# Patient Record
Sex: FEMALE | Race: WHITE | NOT HISPANIC OR LATINO | Employment: FULL TIME | URBAN - NONMETROPOLITAN AREA
[De-identification: names, ages, dates, MRNs, and addresses within clinical notes are randomized per-mention and may not be internally consistent; named-entity substitution may affect disease eponyms.]

---

## 2017-09-05 RX ORDER — DROSPIRENONE/ETHINYL ESTRADIOL/LEVOMEFOLATE CALCIUM AND LEVOMEFOLATE CALCIUM 3-0.02(24)
KIT ORAL
Qty: 28 TABLET | Refills: 12 | OUTPATIENT
Start: 2017-09-05

## 2017-09-11 RX ORDER — DROSPIRENONE, ETHINYL ESTRADIOL AND LEVOMEFOLATE CALCIUM AND LEVOMEFOLATE CALCIUM 3-0.02(24)
1 KIT ORAL DAILY
Qty: 28 TABLET | Refills: 0 | Status: SHIPPED | OUTPATIENT
Start: 2017-09-11 | End: 2017-10-06 | Stop reason: SDUPTHER

## 2017-09-11 NOTE — PROGRESS NOTES
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 She will be out of OCP by then.

## 2017-10-06 ENCOUNTER — OFFICE VISIT (OUTPATIENT)
Dept: OBSTETRICS AND GYNECOLOGY | Facility: CLINIC | Age: 37
End: 2017-10-06

## 2017-10-06 VITALS
WEIGHT: 239 LBS | SYSTOLIC BLOOD PRESSURE: 110 MMHG | HEART RATE: 92 BPM | HEIGHT: 66 IN | BODY MASS INDEX: 38.41 KG/M2 | DIASTOLIC BLOOD PRESSURE: 76 MMHG | OXYGEN SATURATION: 96 %

## 2017-10-06 DIAGNOSIS — Z01.419 ENCOUNTER FOR GYNECOLOGICAL EXAMINATION WITH PAPANICOLAOU SMEAR OF CERVIX: Primary | ICD-10-CM

## 2017-10-06 DIAGNOSIS — N91.2 AMENORRHEA DUE TO ORAL CONTRACEPTIVE: ICD-10-CM

## 2017-10-06 DIAGNOSIS — Z79.3 AMENORRHEA DUE TO ORAL CONTRACEPTIVE: ICD-10-CM

## 2017-10-06 PROCEDURE — 88142 CYTOPATH C/V THIN LAYER: CPT | Performed by: PATHOLOGY

## 2017-10-06 PROCEDURE — 99395 PREV VISIT EST AGE 18-39: CPT | Performed by: NURSE PRACTITIONER

## 2017-10-06 RX ORDER — DROSPIRENONE, ETHINYL ESTRADIOL AND LEVOMEFOLATE CALCIUM AND LEVOMEFOLATE CALCIUM 3-0.02(24)
1 KIT ORAL DAILY
Qty: 28 TABLET | Refills: 11 | Status: SHIPPED | OUTPATIENT
Start: 2017-10-06 | End: 2018-08-06 | Stop reason: SDUPTHER

## 2017-10-07 NOTE — PROGRESS NOTES
Subjective   Chief Complaint   Patient presents with   • Gynecologic Exam     pap/ well woman exam     Liyah Musa is a 37 y.o. year old  presenting to be seen for her annual exam.  Today she has no complaints.    No LMP recorded. Takes OCP continuously.     OB History      Para Term  AB Living    1 1        SAB TAB Ectopic Multiple Live Births                  Current birth control method: OCP (estrogen/progesterone) and tubal ligation.    She is sexually active.  In the past 12 months there has not been new sexual partners.  Condoms are not typically used.  She would not like to be screened for STD's at today's exam.     Past 6 month menstrual history:    Cycle Frequency: absent   Menstrual cycle character: flow has been absent   Cycle Duration: 0 - 1   Number of heavy days of flows: 0   Dysmenorrhea: none and is not affecting her activities of daily living   PMS: none and is not affecting her activities of daily living   Intermenstrual bleeding present: no   Post-coital bleeding present: no     She exercises regularly: yes.  She wears her seat belt:yes.  She has concerns about domestic violence: no.  Last colonoscopy: Never  Last DEXA: Never  Last MMG: Never  Last pap: 16  History of abnormal PAP: Yes    The following portions of the patient's history were reviewed and updated as appropriate:problem list, current medications, allergies, past family history, past medical history, past social history and past surgical history.    Smoking status: Current Every Day Smoker                                                   Packs/day: 0.25      Years: 0.00         Types: Cigarettes  Smokeless status: Never Used                      Ready to quit: No  Counseling given: Yes  She states she knows she can quit. She is only smoking 3-4 cigs/day    History   Alcohol Use No      Review of Systems   Constitutional: Negative.    Respiratory: Negative.    Cardiovascular: Negative.    Gastrointestinal:  "Negative.    Endocrine: Negative.    Genitourinary: Negative.  Negative for menstrual problem.   Skin: Negative.    Neurological: Negative for dizziness, syncope, light-headedness and headaches.   Psychiatric/Behavioral: Negative for suicidal ideas. The patient is not nervous/anxious.         Stressed with nursing school         Objective   /76  Pulse 92  Ht 66\" (167.6 cm)  Wt 239 lb (108 kg)  LMP Comment: OCP Continuous  SpO2 96%  BMI 38.58 kg/m2    General:  well developed; well nourished  no acute distress   Skin:  No suspicious lesions seen   Thyroid: normal to inspection and palpation   Breasts:  Examined in supine position  Symmetric without masses or skin dimpling  Nipples normal without inversion, lesions or discharge  There are no palpable axillary nodes  Fibrocystic changes are present both breasts without a discrete mass   Abdomen: soft, non-tender; no masses  no umbilical or inginual hernias are present  no hepato-splenomegaly  Normal findings: bowel sounds normal   Cardiac: Heart sounds are normal.  Regular rate and rhythm without murmur, gallop or rub.   Resp: Normal expansion.  Clear to auscultation.  No rales, rhonchi, or wheezing.   Psych: alert,oriented, in NAD with a full range of affect, normal behavior and no psychotic features   Pelvis: Uterus:  Clinical staff was present for exam  External genitalia:  normal appearance of the external genitalia including Bartholin's and [place]'s glands.  :  urethral meatus normal;  Vaginal:  normal pink mucosa without prolapse or lesions  Cervix:  normal appearance.  Uterus:  normal size, shape and consistency  Adnexa:  non palpable bilaterally.     Lab Review   No data reviewed    Imaging  No data reviewed       Assessment   1. Encounter for GYN exam with pap smear of cervix  2. Amenorrhea due to oral contraceptive     Plan   1. Pap results: I will send card in mail or call if abnormal. RTC annually for well woman exams  2. Discussed risks of OCP " due to age and smoking status. Pt verbalizes understanding of these risks and agrees to consider progesterone only methods at next visit in May when school is out.      New Medications Ordered This Visit   Medications   • Drospiren-Eth Estrad-Levomefol (BEYAZ) 3-0.02-0.451 MG tablet     Sig: Take 1 tablet by mouth Daily.     Dispense:  28 tablet     Refill:  11          This note was electronically signed.    Zoe Joy, APRN  October 7, 2017

## 2017-10-10 LAB
LAB AP CASE REPORT: NORMAL
LAB AP GYN ADDITIONAL INFORMATION: NORMAL
Lab: NORMAL
PATH INTERP SPEC-IMP: NORMAL
STAT OF ADQ CVX/VAG CYTO-IMP: NORMAL

## 2018-08-06 DIAGNOSIS — N91.2 AMENORRHEA DUE TO ORAL CONTRACEPTIVE: ICD-10-CM

## 2018-08-06 DIAGNOSIS — Z79.3 AMENORRHEA DUE TO ORAL CONTRACEPTIVE: ICD-10-CM

## 2018-08-06 RX ORDER — DROSPIRENONE, ETHINYL ESTRADIOL AND LEVOMEFOLATE CALCIUM AND LEVOMEFOLATE CALCIUM 3-0.02(24)
KIT ORAL
Qty: 28 TABLET | Refills: 3 | Status: SHIPPED | OUTPATIENT
Start: 2018-08-06 | End: 2018-12-11 | Stop reason: SDUPTHER

## 2018-11-30 DIAGNOSIS — N91.2 AMENORRHEA DUE TO ORAL CONTRACEPTIVE: ICD-10-CM

## 2018-11-30 DIAGNOSIS — Z79.3 AMENORRHEA DUE TO ORAL CONTRACEPTIVE: ICD-10-CM

## 2018-11-30 RX ORDER — DROSPIRENONE, ETHINYL ESTRADIOL AND LEVOMEFOLATE CALCIUM AND LEVOMEFOLATE CALCIUM 3-0.02(24)
KIT ORAL
Qty: 28 TABLET | Refills: 3 | OUTPATIENT
Start: 2018-11-30

## 2018-12-11 ENCOUNTER — OFFICE VISIT (OUTPATIENT)
Dept: OBSTETRICS AND GYNECOLOGY | Facility: CLINIC | Age: 38
End: 2018-12-11

## 2018-12-11 VITALS
DIASTOLIC BLOOD PRESSURE: 78 MMHG | HEART RATE: 97 BPM | SYSTOLIC BLOOD PRESSURE: 120 MMHG | BODY MASS INDEX: 40.37 KG/M2 | WEIGHT: 251.2 LBS | RESPIRATION RATE: 16 BRPM | HEIGHT: 66 IN

## 2018-12-11 DIAGNOSIS — Z79.3 AMENORRHEA DUE TO ORAL CONTRACEPTIVE: ICD-10-CM

## 2018-12-11 DIAGNOSIS — Z01.419 ENCOUNTER FOR GYNECOLOGICAL EXAMINATION WITH PAPANICOLAOU SMEAR OF CERVIX: Primary | ICD-10-CM

## 2018-12-11 DIAGNOSIS — N91.2 AMENORRHEA DUE TO ORAL CONTRACEPTIVE: ICD-10-CM

## 2018-12-11 PROBLEM — E66.01 MORBID OBESITY: Status: ACTIVE | Noted: 2018-01-26

## 2018-12-11 PROCEDURE — 99395 PREV VISIT EST AGE 18-39: CPT | Performed by: NURSE PRACTITIONER

## 2018-12-11 PROCEDURE — G0123 SCREEN CERV/VAG THIN LAYER: HCPCS | Performed by: NURSE PRACTITIONER

## 2018-12-11 RX ORDER — PROPRANOLOL HYDROCHLORIDE 20 MG/1
TABLET ORAL
Refills: 0 | COMMUNITY
Start: 2018-09-05 | End: 2020-02-03

## 2018-12-11 RX ORDER — DROSPIRENONE, ETHINYL ESTRADIOL AND LEVOMEFOLATE CALCIUM AND LEVOMEFOLATE CALCIUM 3-0.02(24)
1 KIT ORAL DAILY
Qty: 28 TABLET | Refills: 12 | Status: SHIPPED | OUTPATIENT
Start: 2018-12-11 | End: 2019-06-18

## 2018-12-11 RX ORDER — FENOFIBRATE 145 MG/1
145 TABLET, COATED ORAL DAILY
Refills: 2 | COMMUNITY
Start: 2018-12-05 | End: 2022-07-19

## 2018-12-11 NOTE — PROGRESS NOTES
Subjective   Chief Complaint   Patient presents with   • Gynecologic Exam     well woman annual visit     Liyah Musa is a 38 y.o. year old  presenting to be seen for her annual exam.  Today she has no complaints.    No LMP recorded (lmp unknown). Patient is not currently having periods (Reason: Oral contraceptives). Takes OCP continuously.     OB History      Para Term  AB Living    1 1            SAB TAB Ectopic Molar Multiple Live Births                         Current birth control method: OCP (estrogen/progesterone) and tubal ligation.    She is not currently sexually active. Going through a divorce. In the past 12 months there has not been new sexual partners.  Condoms are not typically used.  She would not like to be screened for STD's at today's exam.     Past 6 month menstrual history:    Cycle Frequency: absent   Menstrual cycle character: flow has been absent   Cycle Duration: 0 - 1   Number of heavy days of flows: 0   Dysmenorrhea: none and is not affecting her activities of daily living   PMS: none and is not affecting her activities of daily living   Intermenstrual bleeding present: no   Post-coital bleeding present: no     She exercises regularly: yes.  She wears her seat belt:yes.  She has concerns about domestic violence: no.  Last colonoscopy: Never  Last DEXA: Never  Last MMG: Never  Last pap: 10/6/17  History of abnormal PAP: Yes    The following portions of the patient's history were reviewed and updated as appropriate:problem list, current medications, allergies, past family history, past medical history, past social history and past surgical history.    Social History    Tobacco Use      Smoking status: Current Every Day Smoker        Packs/day: 0.25        Types: Cigarettes      Smokeless tobacco: Never Used  Ready to quit: No  Counseling given: Yes  She states she knows she can quit. She is only smoking 3-4 cigs/day    Social History     Substance and Sexual Activity  "  Alcohol Use No      Review of Systems   Constitutional: Negative.    Respiratory: Negative.    Cardiovascular: Negative.    Gastrointestinal: Negative.    Endocrine: Negative.    Genitourinary: Negative.  Negative for menstrual problem, pelvic pain and vaginal discharge.   Skin: Negative.    Neurological: Negative for dizziness, syncope, light-headedness and headaches.   Psychiatric/Behavioral: Negative for suicidal ideas. The patient is not nervous/anxious.         Stressed with nursing school         Objective   /78 (BP Location: Right arm, Patient Position: Sitting, Cuff Size: Adult)   Pulse 97   Resp 16   Ht 167.6 cm (66\")   Wt 114 kg (251 lb 3.2 oz)   LMP  (LMP Unknown) Comment: takes OCP continuously  Breastfeeding? No   BMI 40.54 kg/m²     General:  well developed; well nourished  no acute distress   Skin:  No suspicious lesions seen   Thyroid: normal to inspection and palpation   Breasts:  Examined in supine position  Symmetric without masses or skin dimpling  Nipples normal without inversion, lesions or discharge  There are no palpable axillary nodes  Fibrocystic changes are present both breasts without a discrete mass   Abdomen: soft, non-tender; no masses  no umbilical or inginual hernias are present  no hepato-splenomegaly  Normal findings: bowel sounds normal   Cardiac: Heart sounds are normal.  Regular rate and rhythm without murmur, gallop or rub.   Resp: Normal expansion.  Clear to auscultation.  No rales, rhonchi, or wheezing.   Psych: alert,oriented, in NAD with a full range of affect, normal behavior and no psychotic features   Pelvis: Uterus:  Clinical staff was present for exam  External genitalia:  normal appearance of the external genitalia including Bartholin's and [place]'s glands.  :  urethral meatus normal;  Vaginal:  normal pink mucosa without prolapse or lesions  Cervix:  normal appearance.  Uterus:  normal size, shape and consistency  Adnexa:  non palpable bilaterally. "     Lab Review   No data reviewed    Imaging  No data reviewed       Assessment   1. Encounter for GYN exam with pap smear of cervix  2. Amenorrhea due to oral contraceptive     Plan   1. Pap results: I will send card in mail or call if abnormal. RTC annually for well woman exams  2. Discussed risks of OCP due to age and smoking status. Pt verbalizes understanding of these risks fully. She does well and does not desire to change at this time.       New Medications Ordered This Visit   Medications   • Drospiren-Eth Estrad-Levomefol 3-0.02-0.451 MG tablet     Sig: Take 1 tablet by mouth Daily.     Dispense:  28 tablet     Refill:  12          This note was electronically signed.    Zoe Joy, APRN  December 11, 2018

## 2018-12-14 LAB
GEN CATEG CVX/VAG CYTO-IMP: NORMAL
LAB AP CASE REPORT: NORMAL
LAB AP GYN ADDITIONAL INFORMATION: NORMAL
LAB AP GYN OTHER FINDINGS: NORMAL
PATH INTERP SPEC-IMP: NORMAL
STAT OF ADQ CVX/VAG CYTO-IMP: NORMAL

## 2019-06-18 RX ORDER — DROSPIRENONE AND ETHINYL ESTRADIOL 0.02-3(28)
1 KIT ORAL DAILY
Qty: 21 TABLET | Refills: 17 | Status: SHIPPED | OUTPATIENT
Start: 2019-06-18 | End: 2019-12-30 | Stop reason: SDUPTHER

## 2019-12-30 RX ORDER — DROSPIRENONE AND ETHINYL ESTRADIOL 0.02-3(28)
1 KIT ORAL DAILY
Qty: 21 TABLET | Refills: 0 | Status: SHIPPED | OUTPATIENT
Start: 2019-12-30 | End: 2019-12-31 | Stop reason: SDUPTHER

## 2019-12-31 RX ORDER — DROSPIRENONE, ETHINYL ESTRADIOL AND LEVOMEFOLATE CALCIUM AND LEVOMEFOLATE CALCIUM 3-0.02(24)
1 KIT ORAL DAILY
Qty: 28 TABLET | Refills: 0 | Status: SHIPPED | OUTPATIENT
Start: 2019-12-31 | End: 2020-02-03 | Stop reason: SDUPTHER

## 2020-02-03 ENCOUNTER — OFFICE VISIT (OUTPATIENT)
Dept: OBSTETRICS AND GYNECOLOGY | Facility: CLINIC | Age: 40
End: 2020-02-03

## 2020-02-03 VITALS
WEIGHT: 240.4 LBS | DIASTOLIC BLOOD PRESSURE: 70 MMHG | BODY MASS INDEX: 38.63 KG/M2 | HEART RATE: 96 BPM | HEIGHT: 66 IN | SYSTOLIC BLOOD PRESSURE: 118 MMHG

## 2020-02-03 DIAGNOSIS — Z79.3 AMENORRHEA DUE TO ORAL CONTRACEPTIVE: ICD-10-CM

## 2020-02-03 DIAGNOSIS — Z01.419 ENCOUNTER FOR GYNECOLOGICAL EXAMINATION WITH PAPANICOLAOU SMEAR OF CERVIX: Primary | ICD-10-CM

## 2020-02-03 DIAGNOSIS — N91.2 AMENORRHEA DUE TO ORAL CONTRACEPTIVE: ICD-10-CM

## 2020-02-03 PROCEDURE — 87624 HPV HI-RISK TYP POOLED RSLT: CPT | Performed by: NURSE PRACTITIONER

## 2020-02-03 PROCEDURE — 99395 PREV VISIT EST AGE 18-39: CPT | Performed by: NURSE PRACTITIONER

## 2020-02-03 PROCEDURE — G0123 SCREEN CERV/VAG THIN LAYER: HCPCS | Performed by: NURSE PRACTITIONER

## 2020-02-03 RX ORDER — VENLAFAXINE HYDROCHLORIDE 75 MG/1
CAPSULE, EXTENDED RELEASE ORAL
COMMUNITY
Start: 2020-01-06 | End: 2020-11-13

## 2020-02-03 RX ORDER — DROSPIRENONE, ETHINYL ESTRADIOL AND LEVOMEFOLATE CALCIUM AND LEVOMEFOLATE CALCIUM 3-0.02(24)
1 KIT ORAL DAILY
Qty: 28 TABLET | Refills: 17 | Status: SHIPPED | OUTPATIENT
Start: 2020-02-03 | End: 2021-02-22

## 2020-02-03 NOTE — PROGRESS NOTES
Subjective   Chief Complaint   Patient presents with   • Gynecologic Exam     well woman annual    • Contraception     refill on OCP     Liyah Addison is a 39 y.o. year old  presenting to be seen for her annual exam.  Today she has no complaints.    No LMP recorded (lmp unknown). (Menstrual status: Oral contraceptives). Takes OCP continuously.     OB History        1    Para   1    Term                AB        Living           SAB        TAB        Ectopic        Molar        Multiple        Live Births                    Current birth control method: OCP (estrogen/progesterone) and tubal ligation.    She is not currently sexually active.  In the past 12 months there has not been new sexual partners.  Condoms are not typically used.  She would not like to be screened for STD's at today's exam.     Past 6 month menstrual history:    Cycle Frequency: absent   Menstrual cycle character: flow has been absent   Cycle Duration: 0 - 1   Number of heavy days of flows: 0   Dysmenorrhea: none and is not affecting her activities of daily living   PMS: none and is not affecting her activities of daily living   Intermenstrual bleeding present: no   Post-coital bleeding present: no     She exercises regularly: yes.  She wears her seat belt:yes.  She has concerns about domestic violence: no.  Last colonoscopy: Never  Last DEXA: Never  Last MMG: Never  Last pap: 18  History of abnormal PAP: Yes    The following portions of the patient's history were reviewed and updated as appropriate:problem list, current medications, allergies, past family history, past medical history, past social history and past surgical history.    Social History    Tobacco Use      Smoking status: Current Every Day Smoker        Packs/day: 0.25        Years: 19.00        Pack years: 4.75        Types: Cigarettes      Smokeless tobacco: Never Used  Ready to quit: No  Counseling given: Yes  She states she knows she can quit. She is only  "smoking 3-4 cigs/day    Social History     Substance and Sexual Activity   Alcohol Use No      Review of Systems   Constitutional: Negative.    Respiratory: Negative.    Cardiovascular: Negative.    Gastrointestinal: Negative.    Endocrine: Negative.    Genitourinary: Negative.  Negative for menstrual problem, pelvic pain and vaginal discharge.   Skin: Negative.    Neurological: Negative for dizziness, syncope, light-headedness and headaches.   Psychiatric/Behavioral: Negative for suicidal ideas. The patient is not nervous/anxious.          Objective   /70   Pulse 96   Ht 167.6 cm (66\")   Wt 109 kg (240 lb 6.4 oz)   LMP  (LMP Unknown) Comment: no period on OCP  Breastfeeding No   BMI 38.80 kg/m²     General:  well developed; well nourished  no acute distress   Skin:  No suspicious lesions seen   Thyroid: normal to inspection and palpation   Breasts:  Examined in supine position  Symmetric without masses or skin dimpling  Nipples normal without inversion, lesions or discharge  There are no palpable axillary nodes  Fibrocystic changes are present both breasts without a discrete mass   Abdomen: soft, non-tender; no masses  no umbilical or inginual hernias are present  no hepato-splenomegaly  Normal findings: bowel sounds normal   Cardiac: Heart sounds are normal.  Regular rate and rhythm without murmur, gallop or rub.   Resp: Normal expansion.  Clear to auscultation.  No rales, rhonchi, or wheezing.   Psych: alert,oriented, in NAD with a full range of affect, normal behavior and no psychotic features   Pelvis: Uterus:  Clinical staff was present for exam  External genitalia:  normal appearance of the external genitalia including Bartholin's and [place]'s glands.  :  urethral meatus normal;  Vaginal:  normal pink mucosa without prolapse or lesions  Cervix:  normal appearance.  Uterus:  normal size, shape and consistency  Adnexa:  non palpable bilaterally.     Lab Review   CMP and lipids, A1C    Imaging  No " data reviewed       Assessment   1. Encounter for GYN exam with pap smear of cervix  2. Amenorrhea due to oral contraceptive     Plan   1. Pap results: I will send card in mail or call if abnormal. RTC annually for well woman exams  2. Discussed risks of OCP due to age and smoking status. Pt verbalizes understanding of these risks fully. She does well and does not desire to change at this time.       New Medications Ordered This Visit   Medications   • Drospiren-Eth Estrad-Levomefol 3-0.02-0.451 MG tablet     Sig: Take 1 tablet by mouth Daily.     Dispense:  28 tablet     Refill:  17      This note was electronically signed.    Zoe Joy, APRN  February 3, 2020

## 2020-02-06 LAB
GEN CATEG CVX/VAG CYTO-IMP: NORMAL
LAB AP CASE REPORT: NORMAL
LAB AP GYN ADDITIONAL INFORMATION: NORMAL
PATH INTERP SPEC-IMP: NORMAL
STAT OF ADQ CVX/VAG CYTO-IMP: NORMAL

## 2020-02-07 LAB — HPV I/H RISK 4 DNA CVX QL PROBE+SIG AMP: NEGATIVE

## 2021-01-08 ENCOUNTER — LAB (OUTPATIENT)
Dept: LAB | Facility: OTHER | Age: 41
End: 2021-01-08

## 2021-01-08 PROCEDURE — U0003 INFECTIOUS AGENT DETECTION BY NUCLEIC ACID (DNA OR RNA); SEVERE ACUTE RESPIRATORY SYNDROME CORONAVIRUS 2 (SARS-COV-2) (CORONAVIRUS DISEASE [COVID-19]), AMPLIFIED PROBE TECHNIQUE, MAKING USE OF HIGH THROUGHPUT TECHNOLOGIES AS DESCRIBED BY CMS-2020-01-R: HCPCS | Performed by: PHYSICIAN ASSISTANT

## 2021-02-22 DIAGNOSIS — Z79.3 AMENORRHEA DUE TO ORAL CONTRACEPTIVE: ICD-10-CM

## 2021-02-22 DIAGNOSIS — N91.2 AMENORRHEA DUE TO ORAL CONTRACEPTIVE: ICD-10-CM

## 2021-02-22 RX ORDER — DROSPIRENONE, ETHINYL ESTRADIOL AND LEVOMEFOLATE CALCIUM AND LEVOMEFOLATE CALCIUM 3-0.02(24)
1 KIT ORAL DAILY
Qty: 28 TABLET | Refills: 0 | Status: SHIPPED | OUTPATIENT
Start: 2021-02-22 | End: 2021-03-10 | Stop reason: SDUPTHER

## 2021-03-10 ENCOUNTER — LAB (OUTPATIENT)
Dept: LAB | Facility: OTHER | Age: 41
End: 2021-03-10

## 2021-03-10 ENCOUNTER — OFFICE VISIT (OUTPATIENT)
Dept: OBSTETRICS AND GYNECOLOGY | Facility: CLINIC | Age: 41
End: 2021-03-10

## 2021-03-10 VITALS
WEIGHT: 248.2 LBS | HEART RATE: 99 BPM | BODY MASS INDEX: 39.89 KG/M2 | HEIGHT: 66 IN | DIASTOLIC BLOOD PRESSURE: 72 MMHG | SYSTOLIC BLOOD PRESSURE: 116 MMHG

## 2021-03-10 DIAGNOSIS — N91.2 AMENORRHEA DUE TO ORAL CONTRACEPTIVE: ICD-10-CM

## 2021-03-10 DIAGNOSIS — Z79.3 AMENORRHEA DUE TO ORAL CONTRACEPTIVE: ICD-10-CM

## 2021-03-10 DIAGNOSIS — Z01.419 ENCOUNTER FOR GYNECOLOGICAL EXAMINATION WITH PAPANICOLAOU SMEAR OF CERVIX: Primary | ICD-10-CM

## 2021-03-10 PROCEDURE — 99395 PREV VISIT EST AGE 18-39: CPT | Performed by: NURSE PRACTITIONER

## 2021-03-10 RX ORDER — ATORVASTATIN CALCIUM 40 MG/1
TABLET, FILM COATED ORAL
COMMUNITY
Start: 2021-03-05

## 2021-03-10 RX ORDER — VENLAFAXINE HYDROCHLORIDE 75 MG/1
CAPSULE, EXTENDED RELEASE ORAL
COMMUNITY
Start: 2021-02-22 | End: 2022-01-25 | Stop reason: DRUGHIGH

## 2021-03-10 RX ORDER — BUSPIRONE HYDROCHLORIDE 5 MG/1
TABLET ORAL
COMMUNITY
Start: 2021-03-05 | End: 2022-07-19

## 2021-03-10 RX ORDER — ASPIRIN 81 MG/1
81 TABLET, CHEWABLE ORAL DAILY
COMMUNITY

## 2021-03-10 RX ORDER — DROSPIRENONE, ETHINYL ESTRADIOL AND LEVOMEFOLATE CALCIUM AND LEVOMEFOLATE CALCIUM 3-0.02(24)
1 KIT ORAL DAILY
Qty: 28 TABLET | Refills: 12 | Status: SHIPPED | OUTPATIENT
Start: 2021-03-10 | End: 2021-05-11 | Stop reason: SDUPTHER

## 2021-03-12 LAB
LAB AP CASE REPORT: NORMAL
PATH INTERP SPEC-IMP: NORMAL

## 2021-03-12 NOTE — PROGRESS NOTES
Subjective   Chief Complaint   Patient presents with   • Gynecologic Exam     JYOTI Addison is a 40 y.o. year old  presenting to be seen for her annual exam.  Today she has no complaints. Still on OCPs. She voices understanding of the risks of combined hormonal contraceptives after 34yo, especially with smoking. She has decreased the amount she smokes to 1/4 ppd. She does have hyperlipidemia and gained weight. She is very interested in Mirena but wants to wait until the summer to have placed. She will FU in May and discuss it further and schedule placement in  if she is still willing to go that route. She voices total understanding and acceptance of the risk of MI, DVT and CVA on CHC.    No LMP recorded (lmp unknown). (Menstrual status: Oral contraceptives). Takes OCP continuously.     OB History        1    Para   1    Term                AB        Living           SAB        TAB        Ectopic        Molar        Multiple        Live Births                    Current birth control method: OCP (estrogen/progesterone) and tubal ligation.    She is not currently sexually active.  In the past 12 months there has been new sexual partners.  Condoms are not typically used.  She would not like to be screened for STD's at today's exam.     Past 6 month menstrual history:    Cycle Frequency: absent   Menstrual cycle character: flow has been absent   Cycle Duration: 0 - 1   Number of heavy days of flows: 0   Dysmenorrhea: none and is not affecting her activities of daily living   PMS: none and is not affecting her activities of daily living   Intermenstrual bleeding present: no   Post-coital bleeding present: no     She exercises regularly: yes.  She wears her seat belt:yes.  She has concerns about domestic violence: no.  Last colonoscopy: Never  Last DEXA: Never  Last MMG: Never  Last pap: 2/3/2020  History of abnormal PAP: Yes    The following portions of the patient's history were reviewed and  "updated as appropriate:problem list, current medications, allergies, past family history, past medical history, past social history and past surgical history.    Social History    Tobacco Use      Smoking status: Current Every Day Smoker        Packs/day: 0.25        Years: 19.00        Pack years: 4.75        Types: Cigarettes      Smokeless tobacco: Never Used      Tobacco comment: less than a 1/2 a pack  Ready to quit: No  Counseling given: Yes  She states she knows she can quit. She is only smoking 3 cigs/day    Social History     Substance and Sexual Activity   Alcohol Use No      Review of Systems   Constitutional: Negative.    Respiratory: Negative.    Cardiovascular: Negative.  Leg swelling: ankle swelling, increased salt intake.   Gastrointestinal: Negative.    Endocrine: Negative.    Genitourinary: Negative.  Negative for menstrual problem, pelvic pain and vaginal discharge.   Skin: Negative.    Neurological: Negative for dizziness, syncope, light-headedness and headaches.   Psychiatric/Behavioral: Negative for suicidal ideas. The patient is not nervous/anxious.          Objective   /72   Pulse 99   Ht 167.6 cm (66\")   Wt 113 kg (248 lb 3.2 oz)   LMP  (LMP Unknown) Comment: doesn't usually have periods with OCP  Breastfeeding No   BMI 40.06 kg/m²     General:  well developed; well nourished  no acute distress  obese - Body mass index is 40.06 kg/m².   Skin:  No suspicious lesions seen   Thyroid: normal to inspection and palpation   Breasts:  Examined in supine position  Symmetric without masses or skin dimpling  Nipples normal without inversion, lesions or discharge  There are no palpable axillary nodes  Fibrocystic changes are present both breasts without a discrete mass   Abdomen: soft, non-tender; no masses  no umbilical or inginual hernias are present  no hepato-splenomegaly  Normal findings: bowel sounds normal   Cardiac: Heart sounds are normal.  Regular rate and rhythm without murmur, " gallop or rub.   Resp: Normal expansion.  Clear to auscultation.  No rales, rhonchi, or wheezing.   Psych: alert,oriented, in NAD with a full range of affect, normal behavior and no psychotic features   Pelvis: Uterus:  Clinical staff was present for exam  External genitalia:  normal appearance of the external genitalia including Bartholin's and [place]'s glands.  :  urethral meatus normal;  Vaginal:  normal pink mucosa without prolapse or lesions  Cervix:  normal appearance.  Uterus:  normal size, shape and consistency  Adnexa:  non palpable bilaterally.  Rectal:  digital rectal exam not performed; anus visually normal appearing.     Lab Review   CMP and lipids, A1C    Imaging  No data reviewed    Diagnoses and all orders for this visit:    1. Encounter for gynecological examination with Papanicolaou smear of cervix (Primary)  -     Liquid-based Pap Smear, Screening    2. Amenorrhea due to oral contraceptive  -     Drospiren-Eth Estrad-Levomefol 3-0.02-0.451 MG tablet; Take 1 tablet by mouth Daily.  Dispense: 28 tablet; Refill: 12    Pap results: I will send card in mail or call if abnormal. RTC annually for well woman exams.     She voices understanding of the risks of combined hormonal contraceptives after 37yo, especially with smoking. She has decreased the amount she smokes to 1/4 ppd. She does have hyperlipidemia and gained weight. She is very interested in Mirena but wants to wait until the summer to have placed. She will FU in May and discuss it further and schedule placement in June if she is still willing to go that route. She voices total understanding and acceptance of the risk of MI, DVT and CVA on CHC.    MMG recommended at 40yo. She will schedule this at the follow up visit as well.     She is up to date on routine screening labs with her PCP YUE Cheung. We discussed diet and exercise and weight management strategies. Mental health is being addressed, recently started Buspar.     Declines STI testing  today. Only 1 partner in the last year and is not currently sexually active.     Zoe Joy, APRN  March 12, 2021

## 2021-05-11 DIAGNOSIS — N91.2 AMENORRHEA DUE TO ORAL CONTRACEPTIVE: ICD-10-CM

## 2021-05-11 DIAGNOSIS — Z79.3 AMENORRHEA DUE TO ORAL CONTRACEPTIVE: ICD-10-CM

## 2021-05-11 RX ORDER — DROSPIRENONE, ETHINYL ESTRADIOL AND LEVOMEFOLATE CALCIUM AND LEVOMEFOLATE CALCIUM 3-0.02(24)
1 KIT ORAL DAILY
Qty: 84 TABLET | Refills: 2 | Status: SHIPPED | OUTPATIENT
Start: 2021-05-11 | End: 2022-01-25 | Stop reason: SDUPTHER

## 2022-01-25 ENCOUNTER — OFFICE VISIT (OUTPATIENT)
Dept: OBSTETRICS AND GYNECOLOGY | Facility: CLINIC | Age: 42
End: 2022-01-25

## 2022-01-25 VITALS
BODY MASS INDEX: 40.37 KG/M2 | HEART RATE: 90 BPM | DIASTOLIC BLOOD PRESSURE: 76 MMHG | SYSTOLIC BLOOD PRESSURE: 110 MMHG | HEIGHT: 66 IN | WEIGHT: 251.2 LBS

## 2022-01-25 DIAGNOSIS — N91.2 AMENORRHEA DUE TO ORAL CONTRACEPTIVE: ICD-10-CM

## 2022-01-25 DIAGNOSIS — Z79.3 AMENORRHEA DUE TO ORAL CONTRACEPTIVE: ICD-10-CM

## 2022-01-25 DIAGNOSIS — Z30.09 COUNSELING FOR BIRTH CONTROL REGARDING INTRAUTERINE DEVICE (IUD): Primary | ICD-10-CM

## 2022-01-25 PROCEDURE — 99213 OFFICE O/P EST LOW 20 MIN: CPT | Performed by: NURSE PRACTITIONER

## 2022-01-25 RX ORDER — CARVEDILOL 3.12 MG/1
3.12 TABLET ORAL 2 TIMES DAILY WITH MEALS
COMMUNITY
Start: 2021-12-23 | End: 2022-05-02 | Stop reason: DRUGHIGH

## 2022-01-25 RX ORDER — VENLAFAXINE HYDROCHLORIDE 150 MG/1
150 CAPSULE, EXTENDED RELEASE ORAL DAILY
COMMUNITY
Start: 2021-10-28

## 2022-01-25 RX ORDER — HYDROCHLOROTHIAZIDE 25 MG/1
25 TABLET ORAL EVERY MORNING
COMMUNITY
Start: 2022-01-13

## 2022-01-25 RX ORDER — DROSPIRENONE, ETHINYL ESTRADIOL AND LEVOMEFOLATE CALCIUM AND LEVOMEFOLATE CALCIUM 3-0.02(24)
1 KIT ORAL DAILY
Qty: 28 TABLET | Refills: 0 | Status: SHIPPED | OUTPATIENT
Start: 2022-01-25 | End: 2022-01-26 | Stop reason: SDUPTHER

## 2022-01-25 NOTE — PROGRESS NOTES
Mary Addison is a 41 y.o. female.     History of Present Illness   Pt presents to discuss changing from OCP to Mirena for menses suppression. She has had BTL and is not sexually active. We have previously discussed the risks of COCs after 35yo with smoking, obesity, HTN, hyperlipidemia, etc. She is ready to make the switch now, but fears headaches when she stops the OCP. She is agreeable to Mirena IUD.     The following portions of the patient's history were reviewed and updated as appropriate: allergies, current medications, past family history, past medical history, past social history, past surgical history and problem list.    Review of Systems   Constitutional: Negative.  Negative for chills and fever.   Cardiovascular:        Improved with current medication regimen   Genitourinary: Negative for menstrual problem and pelvic pain.   Neurological: Headache: history of with stopping OCP.   Psychiatric/Behavioral: Agitation: improved with buspar.       Objective   Physical Exam  Vitals reviewed.   Constitutional:       Appearance: Normal appearance. She is obese.   Pulmonary:      Effort: Pulmonary effort is normal.   Neurological:      Mental Status: She is alert and oriented to person, place, and time.   Psychiatric:         Mood and Affect: Mood normal.         Behavior: Behavior normal.           Assessment/Plan   Diagnoses and all orders for this visit:    1. Counseling for birth control regarding intrauterine device (IUD) (Primary)    2. Amenorrhea due to oral contraceptive  -     Drospiren-Eth Estrad-Levomefol 3-0.02-0.451 MG tablet; Take 1 tablet by mouth Daily.  Dispense: 28 tablet; Refill: 0      She will need a few OCPs until we can get the Mirena in stock. We will call with arrival of device and schedule insertion.     She voices understanding to procedure process and MOA of Mirena and potential for irregular bleeding.     Info given on Mirena.     All questions answered.

## 2022-01-26 DIAGNOSIS — N91.2 AMENORRHEA DUE TO ORAL CONTRACEPTIVE: ICD-10-CM

## 2022-01-26 DIAGNOSIS — Z79.3 AMENORRHEA DUE TO ORAL CONTRACEPTIVE: ICD-10-CM

## 2022-01-26 RX ORDER — DROSPIRENONE, ETHINYL ESTRADIOL AND LEVOMEFOLATE CALCIUM AND LEVOMEFOLATE CALCIUM 3-0.02(24)
1 KIT ORAL DAILY
Qty: 84 TABLET | Refills: 0 | Status: SHIPPED | OUTPATIENT
Start: 2022-01-26 | End: 2022-03-07 | Stop reason: ALTCHOICE

## 2022-03-07 ENCOUNTER — OFFICE VISIT (OUTPATIENT)
Dept: OBSTETRICS AND GYNECOLOGY | Facility: CLINIC | Age: 42
End: 2022-03-07

## 2022-03-07 VITALS
WEIGHT: 259.6 LBS | SYSTOLIC BLOOD PRESSURE: 122 MMHG | HEIGHT: 66 IN | BODY MASS INDEX: 41.72 KG/M2 | DIASTOLIC BLOOD PRESSURE: 82 MMHG | HEART RATE: 91 BPM

## 2022-03-07 DIAGNOSIS — N94.89 SUPPRESSION OF MENSTRUATION: ICD-10-CM

## 2022-03-07 DIAGNOSIS — Z30.430 ENCOUNTER FOR INSERTION OF MIRENA IUD: Primary | ICD-10-CM

## 2022-03-07 LAB
B-HCG UR QL: NEGATIVE
EXPIRATION DATE: NORMAL
INTERNAL NEGATIVE CONTROL: NORMAL
INTERNAL POSITIVE CONTROL: NORMAL
Lab: NORMAL

## 2022-03-07 PROCEDURE — 81025 URINE PREGNANCY TEST: CPT | Performed by: NURSE PRACTITIONER

## 2022-03-07 PROCEDURE — 58300 INSERT INTRAUTERINE DEVICE: CPT | Performed by: NURSE PRACTITIONER

## 2022-03-07 NOTE — PROGRESS NOTES
IUD Insertion    No LMP recorded (lmp unknown). (Menstrual status: Oral contraceptives). UPT negative. S/P BTL as well. Minena for excessive menstruation suppression. Device is patient supplied through specialty pharmacy.     Date of procedure:  3/7/2022    Risks and benefits discussed? yes  All questions answered? yes  Consents given by The patient  Written consent obtained? yes    Local anesthesia used:  no    Procedure documentation:    After verifying the patient had a low probability of being pregnant and met the criteria for insertion, a sterile speculum has placed and the cervix was cleansed with an antiseptic solution.  Vaginal discharge was scant.  The anterior lip of the cervix was grasped with a tenaculum and the uterine cavity was gently sounded. There was no difficulty passing the sound through the cervix.  Cervical dilation did not need to be performed prior to placing the IUD.  The uterus was anteverted and sounded to 8 cms.  The Mirena was then prepared per the manufacturers instructions.    The Mirena was advanced to a point 2 cms from the fundus and then the arms were released from the sheath.  The device was advanced to the fundus and the device was released fully from the sheath.. The string was cut 3 cms in length.  Bleeding from the cervix was scant.    She tolerated the procedure without any difficulty.    Post procedure instructions: Call if any fever or excessive bleeding or pain.    Follow up needed:  6 weeks sting check and WWE    Diagnosis: Encounter for insertion of Mirena IUD, Suppression of menstruation.     This note was electronically signed.    Zoe Joy, APRN  3/7/2022

## 2022-05-02 ENCOUNTER — OFFICE VISIT (OUTPATIENT)
Dept: OBSTETRICS AND GYNECOLOGY | Facility: CLINIC | Age: 42
End: 2022-05-02

## 2022-05-02 ENCOUNTER — LAB (OUTPATIENT)
Dept: LAB | Facility: OTHER | Age: 42
End: 2022-05-02

## 2022-05-02 VITALS
DIASTOLIC BLOOD PRESSURE: 86 MMHG | BODY MASS INDEX: 42.27 KG/M2 | HEART RATE: 83 BPM | SYSTOLIC BLOOD PRESSURE: 124 MMHG | HEIGHT: 66 IN | WEIGHT: 263 LBS

## 2022-05-02 DIAGNOSIS — Z30.431 IUD CHECK UP: ICD-10-CM

## 2022-05-02 DIAGNOSIS — Z01.419 ENCOUNTER FOR GYNECOLOGICAL EXAMINATION WITH PAPANICOLAOU SMEAR OF CERVIX: Primary | ICD-10-CM

## 2022-05-02 DIAGNOSIS — Z12.31 SCREENING MAMMOGRAM, ENCOUNTER FOR: ICD-10-CM

## 2022-05-02 PROCEDURE — 99396 PREV VISIT EST AGE 40-64: CPT | Performed by: NURSE PRACTITIONER

## 2022-05-02 RX ORDER — CARVEDILOL 6.25 MG/1
6.25 TABLET ORAL 2 TIMES DAILY WITH MEALS
COMMUNITY
Start: 2022-04-26

## 2022-05-02 NOTE — PROGRESS NOTES
Subjective   Chief Complaint   Patient presents with   • Gynecologic Exam     WWE and string check     Liyah Addison is a 41 y.o. year old  presenting to be seen for her annual exam.  Today she has no complaints. Mirena inserted 3/7. Continued on OCPs for about 3 weeks after insertion due to fear of BTB and headaches. She had some bleeding around 3 weeks after insertion and discontinued pills. Since then, had another bleed at the end of April and is still spotting. Bleeding has otherwise been light. No pelvic pain. Not  Been sexually active. Denies headaches other than 2 days after discontinuing OCPs. She is pleased with the transition and willing to continue monitoring.     Patient's last menstrual period was 2022 (approximate).   OB History        1    Para   1    Term                AB        Living   1       SAB        IAB        Ectopic        Molar        Multiple        Live Births                    Current birth control method: Mirena and tubal ligation.    She is not currently sexually active.  In the past 12 months there has not been new sexual partners.  Condoms are not typically used.  She would not like to be screened for STD's at today's exam.     Past 6 month menstrual history:    Cycle Frequency: irregular   Menstrual cycle character: flow is typically light   Cycle Duration: 4-5   Number of heavy days of flows: 0   Dysmenorrhea: none and is not affecting her activities of daily living   PMS: none and is not affecting her activities of daily living   Intermenstrual bleeding present: no   Post-coital bleeding present: no     She exercises regularly: yes.  She wears her seat belt:yes.  She has concerns about domestic violence: no.  Last colonoscopy: Never  Last DEXA: Never  Last MMG: Never  Last pap: 3/10/21  History of abnormal PAP: Yes    The following portions of the patient's history were reviewed and updated as appropriate:problem list, current medications, allergies, past  "family history, past medical history, past social history and past surgical history.    Social History    Tobacco Use      Smoking status: Current Every Day Smoker        Packs/day: 0.25        Years: 19.00        Pack years: 4.75        Types: Cigarettes      Smokeless tobacco: Never Used      Tobacco comment: less than a 1/2 a pack  Ready to quit: No  Counseling given: Yes      Social History     Substance and Sexual Activity   Alcohol Use No      Review of Systems   Constitutional: Negative.    Respiratory: Negative.    Cardiovascular: Negative.    Gastrointestinal: Negative.    Endocrine: Negative.    Genitourinary: Negative.  Negative for menstrual problem, pelvic pain, vaginal discharge and vaginal pain. Vaginal bleeding: currently spotting.   Skin: Negative.    Neurological: Negative for dizziness, syncope, light-headedness and headaches.   Psychiatric/Behavioral: Negative for suicidal ideas.        Anxiety and depression well managed         Objective   /86   Pulse 83   Ht 167.6 cm (66\")   Wt 119 kg (263 lb)   LMP 04/24/2022 (Approximate)   Breastfeeding No   BMI 42.45 kg/m²     General:  well developed; well nourished  no acute distress  obese - Body mass index is 42.45 kg/m².   Skin:  No suspicious lesions seen   Thyroid: normal to inspection and palpation   Breasts:  Examined in supine position  Symmetric without masses or skin dimpling  Nipples normal without inversion, lesions or discharge  There are no palpable axillary nodes  Fibrocystic changes are present both breasts without a discrete mass   Abdomen: soft, non-tender; no masses  no umbilical or inginual hernias are present  no hepato-splenomegaly  Normal findings: bowel sounds normal   Cardiac: Heart sounds are normal.  Regular rate and rhythm without murmur, gallop or rub.   Resp: Normal expansion.  Clear to auscultation.  No rales, rhonchi, or wheezing.   Psych: alert,oriented, in NAD with a full range of affect, normal behavior and " no psychotic features   Pelvis: Uterus:  Clinical staff was present for exam  External genitalia:  normal appearance of the external genitalia including Bartholin's and Maiden's glands.  :  urethral meatus normal;  Vaginal:  normal pink mucosa without prolapse or lesions and blood present -  small amount and brown  Cervix:  normal appearance. IUD string present - 2 cms in length;  Uterus:  normal size, shape and consistency  Adnexa:  non palpable bilaterally.  Rectal:  digital rectal exam not performed; anus visually normal appearing.     Lab Review   No data reviewed    Imaging  No data reviewed    Diagnoses and all orders for this visit:    1. Encounter for gynecological examination with Papanicolaou smear of cervix (Primary)  -     Liquid-based Pap Smear, Screening    2. Screening mammogram, encounter for  -     Mammo Screening Digital Tomosynthesis Bilateral With CAD; Future    3. IUD check up    Pap results: I will send card in mail or call if abnormal. RTC annually for well woman exams.     SBE encouraged monthly. MMG recommended at 40yo. She will schedule this after today's appt. DEXA and colonoscopy not indicated.     She is up to date on routine screening labs with her PCP YUE Cheung. We discussed diet and exercise and weight management strategies. Mental health is being addressed, doing well on Buspar.     Not sexually active in the last year. No concern for STI.     Doing well with Mirena. Reassured of bleeding pattern and likely to improve with time. She denies headaches which was her biggest concern. Continue monitoring and RTC with any concerns or questions.     Zoe Joy, APRN  May 2, 2022

## 2022-05-09 LAB
LAB AP CASE REPORT: NORMAL
PATH INTERP SPEC-IMP: NORMAL

## 2022-06-17 ENCOUNTER — LAB (OUTPATIENT)
Dept: LAB | Facility: OTHER | Age: 42
End: 2022-06-17

## 2022-06-17 PROCEDURE — 87635 SARS-COV-2 COVID-19 AMP PRB: CPT | Performed by: PHYSICIAN ASSISTANT
